# Patient Record
Sex: FEMALE | Race: WHITE | NOT HISPANIC OR LATINO | Employment: OTHER | URBAN - METROPOLITAN AREA
[De-identification: names, ages, dates, MRNs, and addresses within clinical notes are randomized per-mention and may not be internally consistent; named-entity substitution may affect disease eponyms.]

---

## 2017-08-03 ENCOUNTER — OFFICE VISIT (OUTPATIENT)
Dept: OBSTETRICS AND GYNECOLOGY | Facility: CLINIC | Age: 82
End: 2017-08-03

## 2017-08-03 VITALS
DIASTOLIC BLOOD PRESSURE: 70 MMHG | SYSTOLIC BLOOD PRESSURE: 130 MMHG | HEIGHT: 65 IN | WEIGHT: 147.4 LBS | BODY MASS INDEX: 24.56 KG/M2

## 2017-08-03 DIAGNOSIS — Z01.419 ENCOUNTER FOR WELL WOMAN EXAM WITH ROUTINE GYNECOLOGICAL EXAM: ICD-10-CM

## 2017-08-03 DIAGNOSIS — N81.89 PELVIC RELAXATION: Primary | ICD-10-CM

## 2017-08-03 LAB
DEVELOPER EXPIRATION DATE: NORMAL
DEVELOPER LOT NUMBER: NORMAL
EXPIRATION DATE: NORMAL
FECAL OCCULT BLOOD SCREEN, POC: NEGATIVE
Lab: NORMAL
NEGATIVE CONTROL: NEGATIVE
POSITIVE CONTROL: POSITIVE

## 2017-08-03 PROCEDURE — G0328 FECAL BLOOD SCRN IMMUNOASSAY: HCPCS | Performed by: OBSTETRICS & GYNECOLOGY

## 2017-08-03 PROCEDURE — 99203 OFFICE O/P NEW LOW 30 MIN: CPT | Performed by: OBSTETRICS & GYNECOLOGY

## 2017-08-03 RX ORDER — GABAPENTIN 300 MG/1
300 CAPSULE ORAL 3 TIMES DAILY
COMMUNITY

## 2017-08-03 RX ORDER — ASPIRIN 81 MG/1
81 TABLET, CHEWABLE ORAL DAILY
COMMUNITY

## 2017-08-03 RX ORDER — CILOSTAZOL 100 MG/1
100 TABLET ORAL 2 TIMES DAILY
COMMUNITY

## 2017-08-03 RX ORDER — AMLODIPINE BESYLATE 5 MG/1
5 TABLET ORAL DAILY
COMMUNITY

## 2017-08-03 RX ORDER — ATORVASTATIN CALCIUM 20 MG/1
20 TABLET, FILM COATED ORAL DAILY
COMMUNITY

## 2017-08-03 NOTE — PROGRESS NOTES
LISSY Talbot  is a 83 y.o. female who presents for evaluation of pelvic relaxation.  She reports that she feels a bulge in the vagina when she strains.  This causes low back pain and pelvic pressure.  She does not have urinary incontinence, but sometimes has difficulty having a bowel movement.  This has worsened over the last 2-3 years.  It was evaluated by gynecologist last year.  A pessary was placed, but the patient was unable to tolerate this.  She requests other forms of treatment.  Also, she would like to do a routine exam    Chief Complaint   Patient presents with   • Gynecologic Exam       Past Medical History:   Diagnosis Date   • Hyperlipidemia    • Hypertension        Past Surgical History:   Procedure Laterality Date   • HYSTERECTOMY         Social History     Social History   • Marital status: Single     Spouse name: N/A   • Number of children: N/A   • Years of education: N/A     Occupational History   • Not on file.     Social History Main Topics   • Smoking status: Former Smoker   • Smokeless tobacco: Not on file   • Alcohol use No   • Drug use: No   • Sexual activity: No     Other Topics Concern   • Not on file     Social History Narrative   • No narrative on file       The following portions of the patient's history were reviewed and updated as appropriate: allergies, current medications, past family history, past medical history, past social history, past surgical history and problem list.    Review of Systems   Constitutional: Negative.    HENT: Negative.    Respiratory: Negative.    Cardiovascular: Negative.    Gastrointestinal: Negative.    Genitourinary: Negative.  Negative for dysuria and pelvic pain.   Musculoskeletal: Negative.    Skin: Negative.    Allergic/Immunologic: Negative.    Psychiatric/Behavioral: Negative.        Objective     Physical Exam   Constitutional: She is oriented to person, place, and time. She appears well-developed and well-nourished.   HENT:   Head:  Normocephalic and atraumatic.   Cardiovascular: Normal rate and regular rhythm.    Pulmonary/Chest: Effort normal and breath sounds normal. She has no wheezes. She has no rales.   The breasts are equal in size.  There are no palpable lumps.  Axillary adenopathy is absent.  Nipple discharge is absent.   Abdominal: Soft. She exhibits no distension. There is no tenderness.   Genitourinary: There is no lesion on the right labia. There is no lesion on the left labia.   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  Also a third-degree rectocele.  Minimal cystocele.   Neurological: She is alert and oriented to person, place, and time.   Skin: Skin is warm and dry.   Nursing note and vitals reviewed.      Assessment    Susana was seen today for gynecologic exam.    Diagnoses and all orders for this visit:    Pelvic relaxation  -     Ambulatory Referral to Gynecologic Urology    Other orders  -      DEXA SCAN        Plan  1. 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  I counseled the patient regarding the meaning of these findings and I showed her diagram.  I answered her questions.  She declines a second attempt at pessary placement.  She would like to have a surgical correction.  This will require a consult with a uro-gynecologist.  The patient agrees with this recommendation and we will make a referral    History   Smoking Status   • Former Smoker   2. Counseled regarding the importance of calcium, vitamin D and regular exercise for protection of the bones.  I recommend a DEXA.  The patient agrees    3.